# Patient Record
Sex: MALE
[De-identification: names, ages, dates, MRNs, and addresses within clinical notes are randomized per-mention and may not be internally consistent; named-entity substitution may affect disease eponyms.]

---

## 2024-07-22 PROBLEM — Z00.00 ENCOUNTER FOR PREVENTIVE HEALTH EXAMINATION: Status: ACTIVE | Noted: 2024-07-22

## 2024-07-23 ENCOUNTER — NON-APPOINTMENT (OUTPATIENT)
Age: 49
End: 2024-07-23

## 2024-07-24 ENCOUNTER — APPOINTMENT (OUTPATIENT)
Dept: PLASTIC SURGERY | Facility: CLINIC | Age: 49
End: 2024-07-24

## 2024-07-24 VITALS
HEART RATE: 89 BPM | RESPIRATION RATE: 16 BRPM | OXYGEN SATURATION: 95 % | HEIGHT: 66 IN | DIASTOLIC BLOOD PRESSURE: 82 MMHG | WEIGHT: 195 LBS | SYSTOLIC BLOOD PRESSURE: 123 MMHG | BODY MASS INDEX: 31.34 KG/M2

## 2024-07-24 DIAGNOSIS — Z72.0 TOBACCO USE: ICD-10-CM

## 2024-07-24 DIAGNOSIS — Z87.2 PERSONAL HISTORY OF DISEASES OF THE SKIN AND SUBCUTANEOUS TISSUE: ICD-10-CM

## 2024-07-24 DIAGNOSIS — L72.3 SEBACEOUS CYST: ICD-10-CM

## 2024-07-24 PROCEDURE — 99203 OFFICE O/P NEW LOW 30 MIN: CPT

## 2024-07-24 RX ORDER — CEPHALEXIN 500 MG/1
500 TABLET ORAL 3 TIMES DAILY
Qty: 42 | Refills: 0 | Status: ACTIVE | COMMUNITY
Start: 2024-07-24 | End: 1900-01-01

## 2024-07-24 NOTE — HISTORY OF PRESENT ILLNESS
[FreeTextEntry1] : This is a 49 year old male who presents for concern about a lesion on his scalp. The lesion has been present for over 12 months and is getting bigger, causing pain, draining on occasion. He has not had lesions excised in the past. He denies) aspirin anticoagulant use.  He works as an [employer] .  He is here for evaluation.

## 2024-07-24 NOTE — PHYSICAL EXAM
[NI] : Normal [de-identified] : Skin: 2.1 cm subcutaneous nodule on vertex of the scalp, slightly tender on palpation, + erythema, + punctum at the center, non-adherent/ adherent to underlying structures, no drainage on exam

## 2024-07-24 NOTE — PLAN
[TextEntry] : Based on the history and physical, the lesion is most likely an epidermoid/ sebaceous cyst, with early signs of inflammation. . I explain to him that the lesion will need to be excised when it is free of infection/ inflammation. We will plan to do the procedure under local in the procedure room in the office. The risks, benefits, alternatives, including scarring and recovery were discussed and the patient understands.  We will first prescribe a course of antibiotics and he will f/u in 2 weeks.

## 2024-08-07 ENCOUNTER — APPOINTMENT (OUTPATIENT)
Dept: PLASTIC SURGERY | Facility: CLINIC | Age: 49
End: 2024-08-07

## 2024-08-07 PROCEDURE — 99203 OFFICE O/P NEW LOW 30 MIN: CPT

## 2024-08-08 NOTE — PHYSICAL EXAM
[NI] : Normal [de-identified] : 2.1 cm subcutaneous nodule on vertex of the scalp, non-tender on palpation, no erythema, non-adherent/ adherent to underlying structures, no drainage on exam

## 2024-08-08 NOTE — ASSESSMENT
[FreeTextEntry1] : inflamed/ infected epidermoid cyst on scalp; clinically improved with oral antibiotics

## 2024-08-08 NOTE — HISTORY OF PRESENT ILLNESS
[FreeTextEntry1] : This is a 50yo male who returns to clinic for follow up of his inflamed scalp cyst. He was seen 2 weeks ago and was started on a course of oral antibiotics. Subjectively he is feeling better. He denied fever/chills, pain, drainage from the lesion. He presents for evaluation.

## 2024-08-08 NOTE — PLAN
[TextEntry] : Based on the history and examination, it is indicated to excise this lesion once the inflammation has completely resolved.  We will plan to do the procedure under local in the procedure room in the office. The risks, benefits, alternatives, including scarring and recovery were discussed and the patient would like to proceed. We will schedule the patient after obtaining pre-approval.

## 2024-10-24 ENCOUNTER — APPOINTMENT (OUTPATIENT)
Dept: PLASTIC SURGERY | Facility: CLINIC | Age: 49
End: 2024-10-24
Payer: MEDICAID

## 2024-10-24 VITALS
SYSTOLIC BLOOD PRESSURE: 127 MMHG | DIASTOLIC BLOOD PRESSURE: 81 MMHG | WEIGHT: 195 LBS | RESPIRATION RATE: 16 BRPM | BODY MASS INDEX: 31.34 KG/M2 | HEART RATE: 99 BPM | HEIGHT: 66 IN | OXYGEN SATURATION: 95 %

## 2024-10-24 DIAGNOSIS — L72.3 SEBACEOUS CYST: ICD-10-CM

## 2024-10-24 PROCEDURE — 12032 INTMD RPR S/A/T/EXT 2.6-7.5: CPT

## 2024-10-24 PROCEDURE — 11423 EXC H-F-NK-SP B9+MARG 2.1-3: CPT

## 2024-10-30 PROBLEM — L72.3 INFLAMED EPIDERMOID CYST OF SKIN: Status: RESOLVED | Noted: 2024-07-24 | Resolved: 2024-10-30

## 2024-10-31 ENCOUNTER — APPOINTMENT (OUTPATIENT)
Dept: PLASTIC SURGERY | Facility: CLINIC | Age: 49
End: 2024-10-31
Payer: MEDICAID

## 2024-10-31 DIAGNOSIS — L72.0 EPIDERMAL CYST: ICD-10-CM

## 2024-10-31 LAB — CORE LAB BIOPSY: NORMAL

## 2024-10-31 PROCEDURE — 99024 POSTOP FOLLOW-UP VISIT: CPT

## 2025-01-09 ENCOUNTER — APPOINTMENT (OUTPATIENT)
Dept: PLASTIC SURGERY | Facility: CLINIC | Age: 50
End: 2025-01-09